# Patient Record
(demographics unavailable — no encounter records)

---

## 2024-11-25 NOTE — ADDENDUM
[FreeTextEntry1] : Documented by Jessica Mahan acting as a scribe for Dr. Price Pollack on 11/25/2024. All medical record entries made by the Scribe were at my, Dr. Price Pollack's, direction and personally dictated by me on 11/25/2024. I have reviewed the chart and agree that the record accurately reflects my personal performance of the history, physical exam, assessment and plan. I have also personally directed, reviewed, and agree with the discharge instructions.

## 2024-11-25 NOTE — REVIEW OF SYSTEMS
Subjective:     Chief Complaint   Patient presents with    Pain     Right leg and lower back.  Pain has been gone going for 3 months.       HPI:   Ines presents today with     Problem   Muscle Spasm   Cervical Stenosis of Spine    States initial early march, needed repeat procedure due to severe pain. Injection through [facility] which helped neck pain, started PT. overall her neck is doing well but she is having severe pain in her knee.     Primary Hypertension    Chronic in nature.  Pressure is 140/70 today.  Repeat blood pressure 138/70.  States that when she was seeing nephrology she was requesting to stop her losartan related to the potassium and the medication her potassium has been very elevated and they have not been able to decrease it.  She is currently taking Veltassa.  Nephrology wants her to remain on the losartan but she does not want to take Lasix which would be another option to lower potassium.  Patient states that currently taking sucralfate which she was told was a beta-blocker, discussed that this medication and she states it is definitely the 1 she was given.  Currently she is taking amlodipine 5 mg daily.      Acute Pain of Right Knee    This is a severely increased issue.  Is not followed up recently with orthopedics.  Pain has become much more severe since her neck surgery.  States she continues to have to take muscle relaxers prior to bed and in the middle of the night because the spasming of her leg muscles will wake her up.  States that the muscle relaxers were helping somewhat but states that the Flexeril was not that effective and she is interested in trying another medication.  States she did see physical therapist who manipulated her knee.  Worse since Thursday when PT worked on her knee. States that she is having constant muscle spasms triggered by any touch or pressure even gentle touch. States that she is having extremely severe pain.     Type 1 Diabetes Mellitus (Hcc)    This is chronic  in nature.  Patient has overall been well managed.  States she has been taking Reglan for gastroparesis. States currently taking 2 times per day.  States she is doing well on Reglan.  Patient continues to follow closely with endocrinology and nephrology.  Updated labs were obtained today to evaluate the status.    Monofilament testing with a 10 gram force: sensation intact: decreased bilaterally  Visual Inspection: Feet without maceration, ulcers, fissures.  Pedal pulses: intact bilaterally           Current Outpatient Medications Ordered in Epic   Medication Sig Dispense Refill    sucralfate (CARAFATE) 1 GM/10ML Suspension Take 1 g by mouth 4 times a day.      sucralfate (CARAFATE) 1 GM Tab       tizanidine (ZANAFLEX) 4 MG Tab Take 0.5-1 Tablets by mouth every 6 hours as needed (muscle spasms) for up to 30 days. 90 Tablet 0    amLODIPine (NORVASC) 5 MG Tab Take 1 Tablet by mouth at bedtime. 90 Tablet 3    acetaminophen (TYLENOL) 500 MG Tab Take 2 Tablets by mouth every 6 hours. 100 Tablet 1    ezetimibe (ZETIA) 10 MG Tab Take 1 Tablet by mouth every day. 90 Tablet 3    FIASP 100 UNIT/ML Solution       OMEGA-3 FATTY ACIDS PO Take  by mouth.      sodium chloride (SALT) 1 GM Tab Take 1 Tablet by mouth 2 times a day.      Calcium Carbonate (CALCIUM 600 PO) Take 600 mg by mouth every day.      NOVOLOG FLEXPEN 100 UNIT/ML solution for injection       Multiple Vitamin (MULTIVITAMIN PO) Take  by mouth every day.      ELDERBERRY PO Take  by mouth every day.      FIASP 100 UNIT/ML Solution 4-50 units with pump daily      insulin infusion pump Device Inject  under the skin continuous. Patient's own SQ insulin pump    Type of Insulin: Fiasp  Last change of tubing: 3/19/2023 - Change tubing and site every 72 hours    Dosing:  Basal rate:   0000 - 0329 = 0.5 units/hr   0330 - 1129 = 0.85 units/hr   1130 - 1359 = 0.5 units/hr              1400 - 1759 = 0.45 units/hr              1800 - 2359 = 0.5 units/hr    Bolus ratio:   1 unit  ": 12 g carbohydrate at  (breakfast, lunch, dinner, snacks)      Correction ratio:    1 units for every 50 over 150 mg/dL    Disconnect pump if patient becomes hypoglycemic and altered.      CRANBERRY PO Take 500 mg by mouth every evening.      B Complex Vitamins (VITAMIN B COMPLEX PO) Take 1 Tablet by mouth every evening.      LEVOXYL 100 MCG Tab Take 1 Tablet by mouth every morning on an empty stomach.      pantoprazole (PROTONIX) 40 MG Tablet Delayed Response Take 1 Tablet by mouth every day.      rosuvastatin (CRESTOR) 40 MG tablet Take 1 Tab by mouth every bedtime. 30 Tab 1    Cholecalciferol (VITAMIN D-3 PO) Take 5,000 mg by mouth every evening.      Alirocumab (PRALUENT) 150 MG/ML Solution Auto-injector Inject 150 mg under the skin every 14 days. (Patient not taking: Reported on 6/13/2023) 3 mL 3    furosemide (LASIX) 20 MG Tab  (Patient not taking: Reported on 6/13/2023)       No current [facility]-ordered facility-administered medications on file.       Health Maintenance: recommend annual    Review of Systems   Constitutional:  Negative for chills, fever and malaise/fatigue.   Respiratory:  Negative for cough and shortness of breath.    Cardiovascular:  Negative for chest pain, palpitations and leg swelling.   Gastrointestinal:  Negative for abdominal pain.   Neurological:  Negative for dizziness.   Psychiatric/Behavioral:  Negative for depression.          Objective:     Exam:  /70 (BP Location: Left arm, Patient Position: Sitting, BP Cuff Size: Adult)   Pulse 83   Temp 36.6 °C (97.9 °F) (Temporal)   Ht 1.702 m (5' 7\")   Wt 71.4 kg (157 lb 6.4 oz)   LMP 01/01/1983 (LMP Unknown)   SpO2 99%   BMI 24.65 kg/m²  Body mass index is 24.65 kg/m².    Physical Exam  Constitutional:       Appearance: Normal appearance.   HENT:      Head: Normocephalic.   Cardiovascular:      Rate and Rhythm: Normal rate.   Pulmonary:      Effort: Pulmonary effort is normal.   Musculoskeletal:      Right knee: Swelling, " effusion and erythema present. Decreased range of motion. Tenderness present.   Skin:     General: Skin is warm and dry.      Capillary Refill: Capillary refill takes less than 2 seconds.   Neurological:      General: No focal deficit present.      Mental Status: She is alert and oriented to person, place, and time.       Assessment & Plan:     66 y.o. female with the following -     Problem List Items Addressed This Visit       Acute pain of right knee     - Tizanidine 4 mg by mouth up to 3 times daily-for severe muscle spasming of her leg  -recommended contacting her neurologist for discussion regarding the extreme sensitivity that may be nerve pain related--has the potential of trying something like gabapentin or Lyrica if muscle relaxer is not helpful  -Commended follow-up with knee surgeon as she does need further evaluation of the significant swelling.         Relevant Orders    Referral to Orthopedics    Cervical stenosis of spine     - Patient completed, patient is healing well, some concern that procedure is affecting worsening pain in her lower body and knees.         Muscle spasm    Relevant Medications    tizanidine (ZANAFLEX) 4 MG Tab    Primary hypertension     - Continue amlodipine 5 mg daily  -We will review lab results         Type 1 diabetes mellitus (HCC)     - We will continue to monitor for lab results.           Relevant Orders    POCT Retinal Eye Exam    Diabetic Monofilament Lower Extremity Exam (Completed)       I spent a total of 45 minutes with record review, exam, communication with the patient, communication with other providers, and documentation of this encounter.      No follow-ups on file.    I have placed the below orders and discussed them with an approved delegating provider. The MA is performing the below orders under the direction of Dr. Rizzo.     Please note that this dictation was created using voice recognition software. I have made every reasonable attempt to correct  obvious errors, but I expect that there are errors of grammar and possibly content that I did not discover before finalizing the note.         [Negative] : Endocrine

## 2024-11-25 NOTE — ASSESSMENT
[FreeTextEntry1] : Ms. Arango is a 43-year-old female with a history of asthma, OW, allergy, and GERD- stable from a pulmonary perspective - stable. except for weight- on Mounjaro since 11/2023 (50 lbs down)- #1 issue is EDS  problem 1: moderate persistent asthma -(?environmental / occupational related) -continue Advair 250 1 inhalation BID -(generic) or Breo Ellipta 200 at 1 inhalation QD -continue Spiriva Respimat 2 inhalations QD -continue Xopenex 2 puffs before exercise and PRN -she was told to keep a log of her symptoms over the next two weeks -Asthma is believed to be caused by inherited (genetic) and environmental factor, but its exact cause is unknown. Asthma may be triggered by allergens, lung infections, or irritants in the air. Asthma triggers are different for each person -Inhaler technique reviewed as well as oral hygiene techniques reviewed with patient. Avoidance of cold air, extremes of temperature, rescue inhaler should be used before exercise. Order of medication reviewed with patient. Recommended use of a cool mist humidifier in the bedroom.  problem 2: allergies and sinuses- quiet -can use OTC antihistamine PRN -recommended to use Xlear nasal saline spray -Environmental measures for allergies were encouraged including mattress and pillow cover, air purifier, and environmental controls.  Problem 3: GERD -Things to avoid including overeating, spicy foods, tight clothing, eating within three hours of bed, this list is not all inclusive. -For treatment of reflux, possible options discussed including diet control, H2 blockers, PPIs, as well as coating motility agents discussed as treatment options. Timing of meals and proximity of last meal to sleep were discussed. If symptoms persist, a formal gastrointestinal evaluation is needed.  problem 4: overweight- in progress -Mounjaro in place (50 lbs down) since 11/2023 -recommended consultation with Dr. Panfilo Smiley; Berberine OTC  -recommended Dr. Jung Vargas 10 day detox -continue use of Peloton -recommended Vital Proteins Collagen Peptides, 2 scoops BID  -recommended to read "Bright Spots [employer] Land Mines" , Engine 2" and "Obesity Code" -Weight loss, exercise, and diet control were discussed and are highly encouraged. Treatment options were given such as, aqua therapy, and contacting a nutritionist. Recommended to use the elliptical, stationary bike, less use of treadmill. Obesity is associated with worsening asthma, shortness of breath, and potential for cardiac disease, diabetes, and other underlying medical conditions.  problem 5: facial numbness (resolved) -recommended 2-3 day holiday from the drug -recommended to f/u with dermatologist  Problem 6: EDS ?Mounjaro effects vs OSAS -complete home sleep study -recommended CoQ10 200mg QAM  -recommended Vitamin D 5.000 IUs QAM -Sleep apnea is associated with adverse clinical consequences which can affect most organ systems. Cardiovascular disease risk includes arrhythmias, atrial fibrillation, hypertension, coronary artery disease, and stroke. Metabolic disorders include diabetes type 2, non-alcoholic fatty liver disease. Mood disorder especially depression; and cognitive decline especially in the elderly. Associations with chronic reflux/Zapata's esophagus some but not all inclusive. -Reasons include arousal consistent with hypopnea; respiratory events most prominent in REM sleep or supine position; therefore sleep staging and body position are important for accurate diagnosis and estimation of AHI.   problem 7: health maintenance -recommended [employer] Stretches on Direct Sitters  -discussed and recommended Covid 19 precaution, vaccine, and booster at length. -s/p Covid 19 Pfizer Vaccine x2 4/2021 -referred to Sheryl Leventhal - Functional Medicine Specialist -s/p flu shot 2024 -recommended strep pneumonia vaccines: Prevnar-13 vaccine, followed by Pneumo vaccine 23 one year following -recommended early intervention for URIs -recommended regular osteoporosis evaluations -recommended early dermatological evaluations -recommended after the age of 50 to the age of 70, colonoscopy every 5 years  F/P in 6 months - SPI / NIOX She is encouraged to call with any changes, concerns, or questions.

## 2024-11-25 NOTE — REASON FOR VISIT
[Follow-Up] : a follow-up visit [TextBox_44] : abnormal PFTs, allergic rhinitis, asthma, OW, and SOB

## 2024-11-25 NOTE — PROCEDURE
[FreeTextEntry1] : Full PFT reveals normal flows; FEV1 was 2.52L which is 94% of predicted; normal lung volumes; normal diffusion at 22.60, which is 110% of predicted; normal flow volume loop. PFTs were performed to evaluate for SOB, asthma  FENO was 5; a normal value being less than 25 Fractional exhaled nitric oxide (FENO) is regarded as a simple, noninvasive method for assessing eosinophilic airway inflammation. Produced by a variety of cells within the lung, nitric oxide (NO) concentrations are generally low in healthy individuals. However, high concentrations of NO appear to be involved in nonspecific host defense mechanisms and chronic inflammatory diseases such as asthma. The American Thoracic Society (ATS) therefore has recommended using FENO to aid in the diagnosis and monitoring of eosinophilic airway inflammation and asthma, and for identifying steroid responsive individuals whose chronic respiratory symptoms may be caused by airway inflammation.

## 2024-11-25 NOTE — HISTORY OF PRESENT ILLNESS
[FreeTextEntry1] : Ms. Arango is a 43-year-old female with a history of abnormal PFTs, allergic rhinitis, asthma, OW, and SOB presenting to the office today for a follow-up pulmonary evaluation. Her chief complaint is    -she notes she's lost 50 lbs, and she feels more comfortable -she notes her energy levels are very low -she notes she's on Mounjaro 7.5. When she was on 10 mg, she had severe nausea  -she notes her bowel movements are less frequent and harder to pass -she denies adequate hydration -she notes she frequently wakes up due to nocturia X2 -she denies drinking water after a certain time -she denies knowing if she snores because she lives alone -she notes that, when she traveled with her friend, nobody said anything about her snoring -she notes she sometimes has trouble falling back asleep after waking up -she's uncertain if Mounjaro is waking her up -she denies falling asleep at work -she notes falling asleep on her couch  -she notes she stopped exercising (Peloton) due to fatigue -she notes her prior face numbness resolved -she notes she's on IV infusions for scalp psoriasis. Her psoriasis has improved, and she's also on topicals -she denies taking any supplements  -she denies any headaches, emesis, fever, chills, sweats, chest pain, chest pressure, coughing, wheezing, palpitations, diarrhea, dysphagia, vertigo, arthralgias, myalgias, leg swelling, itchy eyes, itchy ears, heartburn, reflux, or sour taste in the mouth.

## 2025-05-27 NOTE — HISTORY OF PRESENT ILLNESS
[FreeTextEntry1] : Ms. Arango is a 44-year-old female with a history of abnormal PFTs, allergic rhinitis, asthma, OW, and SOB presenting to the office today for a follow-up pulmonary evaluation. Her chief complaint is    -she notes she is currently mourning the death of her mother  -she notes losing weight due to stress  -she notes appetite is poor  -she notes poor quality of sleep -she notes going for infusions weekly  -she denies exercising -she notes diet is poor  -she notes fatigue after work  -she notes wanting to work out more  -she notes vision is stable  -she denies any headaches, nausea, emesis, fever, chills, sweats, chest pain, chest pressure, coughing, wheezing, palpitations, diarrhea, constipation, dysphagia, vertigo, arthralgias, myalgias, leg swelling, itchy eyes, itchy ears, heartburn, reflux, or sour taste in the mouth.

## 2025-05-27 NOTE — ASSESSMENT
[FreeTextEntry1] : Ms. Arango is a 44-year-old female with a history of asthma, OW, allergy, and GERD- stable from a pulmonary perspective - stable. except for weight- on Mounjaro since 11/2023 (50 lbs down)- #1 issue is inertia   problem 1: moderate persistent asthma -(?environmental / occupational related) -continue Advair 250 1 inhalation BID -(generic) or Breo Ellipta 200 at 1 inhalation QD -continue Spiriva Respimat 2 inhalations QD -continue Xopenex 2 puffs before exercise and PRN -she was told to keep a log of her symptoms over the next two weeks -Asthma is believed to be caused by inherited (genetic) and environmental factor, but its exact cause is unknown. Asthma may be triggered by allergens, lung infections, or irritants in the air. Asthma triggers are different for each person -Inhaler technique reviewed as well as oral hygiene techniques reviewed with patient. Avoidance of cold air, extremes of temperature, rescue inhaler should be used before exercise. Order of medication reviewed with patient. Recommended use of a cool mist humidifier in the bedroom.  problem 2: allergies and sinuses- quiet -can use OTC antihistamine PRN -recommended to use Xlear nasal saline spray -Environmental measures for allergies were encouraged including mattress and pillow cover, air purifier, and environmental controls.  Problem 3: GERD -Things to avoid including overeating, spicy foods, tight clothing, eating within three hours of bed, this list is not all inclusive. -For treatment of reflux, possible options discussed including diet control, H2 blockers, PPIs, as well as coating motility agents discussed as treatment options. Timing of meals and proximity of last meal to sleep were discussed. If symptoms persist, a formal gastrointestinal evaluation is needed.  problem 4: overweight- in progress -Mounjaro in place (50 lbs down) since 11/2023 -recommended consultation with Dr. Panfilo Smiley; Berberine OTC  -recommended Dr. Jung Vargas 10 day detox -continue use of Peloton -recommended Vital Proteins Collagen Peptides, 2 scoops BID  -recommended to read "[employer] Land [employer]ines" , Engine 2" and "Obesity Code" -Weight loss, exercise, and diet control were discussed and are highly encouraged. Treatment options were given such as, aqua therapy, and contacting a nutritionist. Recommended to use the elliptical, stationary bike, less use of treadmill. Obesity is associated with worsening asthma, shortness of breath, and potential for cardiac disease, diabetes, and other underlying medical conditions.  problem 5: facial numbness (resolved) -recommended 2-3 day holiday from the drug -recommended to f/u with dermatologist  Problem 6: EDS ?Mounjaro effects vs OSAS -complete home sleep study if needed  -recommended CoQ10 200mg QAM  -recommended Vitamin D 5.000 IUs QAM -Sleep apnea is associated with adverse clinical consequences which can affect most organ systems. Cardiovascular disease risk includes arrhythmias, atrial fibrillation, hypertension, coronary artery disease, and stroke. Metabolic disorders include diabetes type 2, non-alcoholic fatty liver disease. Mood disorder especially depression; and cognitive decline especially in the elderly. Associations with chronic reflux/Zapata's esophagus some but not all inclusive. -Reasons include arousal consistent with hypopnea; respiratory events most prominent in REM sleep or supine position; therefore sleep staging and body position are important for accurate diagnosis and estimation of AHI.   problem 7: health maintenance -recommended [employer] Stretches on "[employer]ube  -discussed and recommended Covid 19 precaution, vaccine, and booster at length. -s/p Covid 19 Pfizer Vaccine x2 4/2021 -referred to Sheryl Leventhal - Functional Medicine Specialist -s/p flu shot 2024 -recommended strep pneumonia vaccines: Prevnar-13 vaccine, followed by Pneumo vaccine 23 one year following -recommended early intervention for URIs -recommended regular osteoporosis evaluations -recommended early dermatological evaluations -recommended after the age of 50 to the age of 70, colonoscopy every 5 years  F/P in 6 months - SPI / NIOX She is encouraged to call with any changes, concerns, or questions.

## 2025-05-27 NOTE — ADDENDUM
[FreeTextEntry1] : Documented by Tawanda Granados acting as a scribe for Dr. Price Pollack on 05/27/2025. All medical record entries made by the Scribe were at my, Dr. Price Pollack's, direction and personally dictated by me on 05/27/2025. I have reviewed the chart and agree that the record accurately reflects my personal performance of the history, physical exam, assessment and plan. I have also personally directed, reviewed, and agree with the discharge instructions.